# Patient Record
Sex: MALE | Race: WHITE | NOT HISPANIC OR LATINO | Employment: OTHER | URBAN - METROPOLITAN AREA
[De-identification: names, ages, dates, MRNs, and addresses within clinical notes are randomized per-mention and may not be internally consistent; named-entity substitution may affect disease eponyms.]

---

## 2019-07-02 PROCEDURE — 88305 TISSUE EXAM BY PATHOLOGIST: CPT | Performed by: PATHOLOGY

## 2019-07-03 ENCOUNTER — LAB REQUISITION (OUTPATIENT)
Dept: LAB | Facility: HOSPITAL | Age: 79
End: 2019-07-03
Payer: MEDICARE

## 2019-07-03 DIAGNOSIS — D49.2 NEOPLASM OF UNSPECIFIED BEHAVIOR OF BONE, SOFT TISSUE, AND SKIN: ICD-10-CM

## 2019-07-07 ENCOUNTER — ANESTHESIA EVENT (OUTPATIENT)
Dept: PERIOP | Facility: HOSPITAL | Age: 79
End: 2019-07-07
Payer: MEDICARE

## 2019-07-08 ENCOUNTER — HOSPITAL ENCOUNTER (OUTPATIENT)
Facility: HOSPITAL | Age: 79
Setting detail: OUTPATIENT SURGERY
Discharge: HOME | End: 2019-07-08
Attending: PLASTIC SURGERY | Admitting: PLASTIC SURGERY
Payer: MEDICARE

## 2019-07-08 ENCOUNTER — ANESTHESIA (OUTPATIENT)
Dept: PERIOP | Facility: HOSPITAL | Age: 79
End: 2019-07-08
Payer: MEDICARE

## 2019-07-08 VITALS
DIASTOLIC BLOOD PRESSURE: 63 MMHG | TEMPERATURE: 97.4 F | SYSTOLIC BLOOD PRESSURE: 124 MMHG | HEART RATE: 71 BPM | RESPIRATION RATE: 16 BRPM | WEIGHT: 216 LBS | BODY MASS INDEX: 30.92 KG/M2 | OXYGEN SATURATION: 93 % | HEIGHT: 70 IN

## 2019-07-08 DIAGNOSIS — D49.2 NEOPLASM OF UNSPECIFIED BEHAVIOR OF BONE, SOFT TISSUE, AND SKIN: ICD-10-CM

## 2019-07-08 PROCEDURE — 88331 PATH CONSLTJ SURG 1 BLK 1SPC: CPT | Performed by: PATHOLOGY

## 2019-07-08 PROCEDURE — 88305 TISSUE EXAM BY PATHOLOGIST: CPT | Performed by: PATHOLOGY

## 2019-07-08 RX ORDER — HYDROCODONE BITARTRATE AND ACETAMINOPHEN 5; 325 MG/1; MG/1
1 TABLET ORAL EVERY 6 HOURS PRN
Status: DISCONTINUED | OUTPATIENT
Start: 2019-07-08 | End: 2019-07-08 | Stop reason: HOSPADM

## 2019-07-08 RX ORDER — GLYCOPYRROLATE 0.2 MG/ML
INJECTION INTRAMUSCULAR; INTRAVENOUS AS NEEDED
Status: DISCONTINUED | OUTPATIENT
Start: 2019-07-08 | End: 2019-07-08 | Stop reason: SURG

## 2019-07-08 RX ORDER — LIDOCAINE HYDROCHLORIDE AND EPINEPHRINE 5; 5 MG/ML; UG/ML
INJECTION, SOLUTION INFILTRATION; PERINEURAL AS NEEDED
Status: DISCONTINUED | OUTPATIENT
Start: 2019-07-08 | End: 2019-07-08 | Stop reason: HOSPADM

## 2019-07-08 RX ORDER — PROPOFOL 10 MG/ML
INJECTION, EMULSION INTRAVENOUS AS NEEDED
Status: DISCONTINUED | OUTPATIENT
Start: 2019-07-08 | End: 2019-07-08 | Stop reason: SURG

## 2019-07-08 RX ORDER — ATORVASTATIN CALCIUM 80 MG/1
80 TABLET, FILM COATED ORAL DAILY
COMMUNITY

## 2019-07-08 RX ORDER — BUPIVACAINE HYDROCHLORIDE 2.5 MG/ML
INJECTION, SOLUTION INFILTRATION; PERINEURAL AS NEEDED
Status: DISCONTINUED | OUTPATIENT
Start: 2019-07-08 | End: 2019-07-08 | Stop reason: HOSPADM

## 2019-07-08 RX ORDER — MAGNESIUM HYDROXIDE 1200 MG/15ML
LIQUID ORAL AS NEEDED
Status: DISCONTINUED | OUTPATIENT
Start: 2019-07-08 | End: 2019-07-08 | Stop reason: HOSPADM

## 2019-07-08 RX ORDER — MIDAZOLAM HYDROCHLORIDE 1 MG/ML
INJECTION INTRAMUSCULAR; INTRAVENOUS AS NEEDED
Status: DISCONTINUED | OUTPATIENT
Start: 2019-07-08 | End: 2019-07-08 | Stop reason: SURG

## 2019-07-08 RX ORDER — FENTANYL CITRATE 50 UG/ML
INJECTION, SOLUTION INTRAMUSCULAR; INTRAVENOUS AS NEEDED
Status: DISCONTINUED | OUTPATIENT
Start: 2019-07-08 | End: 2019-07-08 | Stop reason: SURG

## 2019-07-08 RX ORDER — LISINOPRIL 5 MG/1
5 TABLET ORAL DAILY
COMMUNITY

## 2019-07-08 RX ORDER — DEXAMETHASONE SODIUM PHOSPHATE 4 MG/ML
INJECTION, SOLUTION INTRA-ARTICULAR; INTRALESIONAL; INTRAMUSCULAR; INTRAVENOUS; SOFT TISSUE AS NEEDED
Status: DISCONTINUED | OUTPATIENT
Start: 2019-07-08 | End: 2019-07-08 | Stop reason: SURG

## 2019-07-08 RX ORDER — ASPIRIN 81 MG/1
81 TABLET ORAL DAILY
COMMUNITY

## 2019-07-08 RX ORDER — HYDROMORPHONE HCL/PF 1 MG/ML
0.5 SYRINGE (ML) INJECTION
Status: DISCONTINUED | OUTPATIENT
Start: 2019-07-08 | End: 2019-07-08 | Stop reason: HOSPADM

## 2019-07-08 RX ORDER — FENTANYL CITRATE/PF 50 MCG/ML
25 SYRINGE (ML) INJECTION
Status: DISCONTINUED | OUTPATIENT
Start: 2019-07-08 | End: 2019-07-08 | Stop reason: HOSPADM

## 2019-07-08 RX ORDER — DIPHENHYDRAMINE HYDROCHLORIDE 50 MG/ML
12.5 INJECTION INTRAMUSCULAR; INTRAVENOUS ONCE AS NEEDED
Status: DISCONTINUED | OUTPATIENT
Start: 2019-07-08 | End: 2019-07-08 | Stop reason: HOSPADM

## 2019-07-08 RX ORDER — ONDANSETRON 4 MG/1
4 TABLET, ORALLY DISINTEGRATING ORAL EVERY 6 HOURS PRN
Status: DISCONTINUED | OUTPATIENT
Start: 2019-07-08 | End: 2019-07-08 | Stop reason: HOSPADM

## 2019-07-08 RX ORDER — METOPROLOL TARTRATE 100 MG/1
100 TABLET ORAL EVERY 12 HOURS SCHEDULED
COMMUNITY

## 2019-07-08 RX ORDER — LIDOCAINE HYDROCHLORIDE 20 MG/ML
INJECTION, SOLUTION INFILTRATION; PERINEURAL AS NEEDED
Status: DISCONTINUED | OUTPATIENT
Start: 2019-07-08 | End: 2019-07-08 | Stop reason: HOSPADM

## 2019-07-08 RX ORDER — ONDANSETRON 2 MG/ML
INJECTION INTRAMUSCULAR; INTRAVENOUS AS NEEDED
Status: DISCONTINUED | OUTPATIENT
Start: 2019-07-08 | End: 2019-07-08 | Stop reason: SURG

## 2019-07-08 RX ORDER — AMOXICILLIN 500 MG
CAPSULE ORAL DAILY
COMMUNITY

## 2019-07-08 RX ORDER — LIDOCAINE HYDROCHLORIDE 10 MG/ML
INJECTION, SOLUTION INFILTRATION; PERINEURAL AS NEEDED
Status: DISCONTINUED | OUTPATIENT
Start: 2019-07-08 | End: 2019-07-08 | Stop reason: SURG

## 2019-07-08 RX ORDER — EPHEDRINE SULFATE 50 MG/ML
INJECTION INTRAVENOUS AS NEEDED
Status: DISCONTINUED | OUTPATIENT
Start: 2019-07-08 | End: 2019-07-08 | Stop reason: SURG

## 2019-07-08 RX ORDER — SODIUM CHLORIDE, SODIUM LACTATE, POTASSIUM CHLORIDE, CALCIUM CHLORIDE 600; 310; 30; 20 MG/100ML; MG/100ML; MG/100ML; MG/100ML
50 INJECTION, SOLUTION INTRAVENOUS CONTINUOUS
Status: DISCONTINUED | OUTPATIENT
Start: 2019-07-08 | End: 2019-07-08 | Stop reason: HOSPADM

## 2019-07-08 RX ADMIN — MIDAZOLAM HYDROCHLORIDE 1 MG: 1 INJECTION, SOLUTION INTRAMUSCULAR; INTRAVENOUS at 09:37

## 2019-07-08 RX ADMIN — MIDAZOLAM HYDROCHLORIDE 1 MG: 1 INJECTION, SOLUTION INTRAMUSCULAR; INTRAVENOUS at 09:03

## 2019-07-08 RX ADMIN — ONDANSETRON HYDROCHLORIDE 4 MG: 2 INJECTION, SOLUTION INTRAMUSCULAR; INTRAVENOUS at 10:05

## 2019-07-08 RX ADMIN — SODIUM CHLORIDE, POTASSIUM CHLORIDE, SODIUM LACTATE AND CALCIUM CHLORIDE 50 ML/HR: 600; 310; 30; 20 INJECTION, SOLUTION INTRAVENOUS at 08:32

## 2019-07-08 RX ADMIN — GLYCOPYRROLATE 0.2 MG: 0.2 INJECTION, SOLUTION INTRAMUSCULAR; INTRAVENOUS at 08:55

## 2019-07-08 RX ADMIN — PROPOFOL 50 MG: 10 INJECTION, EMULSION INTRAVENOUS at 09:47

## 2019-07-08 RX ADMIN — EPHEDRINE SULFATE 10 MG: 50 INJECTION, SOLUTION INTRAVENOUS at 09:23

## 2019-07-08 RX ADMIN — DEXAMETHASONE SODIUM PHOSPHATE 4 MG: 4 INJECTION, SOLUTION INTRA-ARTICULAR; INTRALESIONAL; INTRAMUSCULAR; INTRAVENOUS; SOFT TISSUE at 09:38

## 2019-07-08 RX ADMIN — EPHEDRINE SULFATE 15 MG: 50 INJECTION, SOLUTION INTRAVENOUS at 11:02

## 2019-07-08 RX ADMIN — EPHEDRINE SULFATE 10 MG: 50 INJECTION, SOLUTION INTRAVENOUS at 09:32

## 2019-07-08 RX ADMIN — EPHEDRINE SULFATE 15 MG: 50 INJECTION, SOLUTION INTRAVENOUS at 09:57

## 2019-07-08 RX ADMIN — FENTANYL CITRATE 50 MCG: 50 INJECTION INTRAMUSCULAR; INTRAVENOUS at 09:03

## 2019-07-08 RX ADMIN — GLYCOPYRROLATE 0.1 MG: 0.2 INJECTION, SOLUTION INTRAMUSCULAR; INTRAVENOUS at 09:30

## 2019-07-08 RX ADMIN — LIDOCAINE HYDROCHLORIDE 40 MG: 10 INJECTION, SOLUTION INFILTRATION; PERINEURAL at 09:03

## 2019-07-08 RX ADMIN — PROPOFOL 60 MG: 10 INJECTION, EMULSION INTRAVENOUS at 09:03

## 2019-07-08 RX ADMIN — SODIUM CHLORIDE, POTASSIUM CHLORIDE, SODIUM LACTATE AND CALCIUM CHLORIDE: 600; 310; 30; 20 INJECTION, SOLUTION INTRAVENOUS at 11:09

## 2019-07-08 RX ADMIN — FENTANYL CITRATE 50 MCG: 50 INJECTION INTRAMUSCULAR; INTRAVENOUS at 09:47

## 2019-07-08 NOTE — OP NOTE
OPERATIVE REPORT  PATIENT NAME: Jermaine Sequeira  :  1940  MRN: [id number]  Pt Location:  OR ROOM 11    SURGERY DATE: 2019    Surgeon(s) and Role:     * Beverley Jeffers MD - Primary     * Abrahan Murphy - Assisting    Preop and postoperative Diagnosis:  Basal carcinoma right medial ankle    Operative Procedure(s) (LRB):  REMOVE LEG SKIN CANCER, FROZEN SECTION, FLAP REPAIR (Right)  based propeller flap    Operative history:  The patient had a lesion of the medial right ankle measuring about 20 x 25 mm in size  it was removed taking 5 mm margins  A suture was placed at the 12:00 p m  Margin  Frozen section examination revealed this to be a basal carcinoma totally removed  To cover the area a propeller flap proximal to this based on a posterior tibial artery  was designed  The width of the flap was equal to that of the defect  The distance from the distal point of the defect to the  was 8 5 cm  The distance from the  to the proximal end of the tapered flap was 10 5 cm  Direct closure of the donor site was possible  Operative procedure: The patient taken to the operating placed supine the operating table  He was prepped and draped in the usual fashion  Anesthesia was general via laryngeal mask anesthesia  The lesion was excised sharply using the Bovie for cutting coagulation purposes  Is marked and sent for frozen section examination  This revealed all tumor to be removed  Therefore whereby thermograhphy the  was considered to be present, a flap was designed with the minor Blade measured from the defect to the  with length being slightly less than that of the major Blade that extended proximally  The tourniquet was elevated to 350 mm hg at the thigh level without elevation of the leg nor exsanguination  An exploratory incision was made on the anterior margin of the designed flap at the level of the     This was taken down to the deep fascia  Dissection then proceeded going posteriorly until the  was found  The deep fascia was divided proximal and distal to the  allow it to be carefully dissected down to the posterior tibial vessels  This required coagulation the side branches with the bipolar and/or hemoclips  The posterior border could then be incised and the flap raised at a suprafascial level superior inferior to that   The flap could easily be rotated counter-clockwise into the defect  The tourniquet was deflated good fill of the flap was noted  The flap was cut to fit and inset over the defect with 3-0 nylon interrupted simple and vertical mattress sutures, as were the size of the flap  The donor site could be closed directly with 3-0 nylon interrupted simple vertical mattress sutures  A Penrose drain was placed under the superior aspect of the flap  A bulky wrap was applied  The patient tolerated procedure well taken to recovery in good condition        Specimen(s):  ID Type Source Tests Collected by Time Destination   1 : right ankle lesion Tissue Lesion TISSUE EXAM Louann Everett MD 7/8/2019 0930        Estimated Blood Loss:   Minimal    Drains:  Open Drain Right Foot (Active)   Number of days: 0         SIGNATURE: Louann Everett MD  DATE: July 8, 2019  TIME: 12:01 PM

## 2019-07-08 NOTE — PERIOPERATIVE NURSING NOTE
Returned from [employer] awake and denying any pain  Dressing dry and intact  Toes cool to touch  Eating and drinking  ivs running

## 2019-07-08 NOTE — ANESTHESIA POSTPROCEDURE EVALUATION
Post-Op Assessment Note    CV Status:  Stable    Pain management: satisfactory to patient     Mental Status:  Alert   Hydration Status:  Stable   PONV Controlled:  Controlled   Airway Patency:  Patent   Post Op Vitals Reviewed: Yes      Staff: CRNA           BP   149/70   Temp     Pulse 78   Resp   22   SpO2   98

## 2019-07-08 NOTE — PERIOPERATIVE NURSING NOTE
Ate   Dressing dry  Wants to go home  ivs out  Discharged via w/c after discharge instructions given and verbalized an understanding of same

## 2019-07-08 NOTE — ANESTHESIA PREPROCEDURE EVALUATION
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) > 6 months, CAD , Cardiac stents > 1 year No angina , Aortic disease (AAA Infrarenal 4 1cm),   Comment: EF 45% ECHO 2018,  Pulmonary  Smoker cigarette smoker  , Tobacco cessation counseling given ,        GI/Hepatic  Negative GI/hepatic ROS          Negative  ROS        Endo/Other  Negative endo/other ROS      GYN       Hematology  Negative hematology ROS      Musculoskeletal  Negative musculoskeletal ROS        Neurology      Comment: H/O Gullian-Aripeka syndrome Psychology   Negative psychology ROS            Component Name Value Ref Range   Echo EF Estimated 45     Result Narrative    ECHO REPORT    ROMY ARZOLA                       10/02/2018 08:14        Account Number:   [de-identified]    Clinical Indications:    S/P PTCA (percutaneous transluminal coronary     angioplasty)    Technical Quality:    ICD Codes:          Z98 61    Rhythm:    Technical Notes:    CONCLUSIONS    Definity Echo Contrast was utilized due to suboptimal echocardiographic imaging  Normal left ventricular chamber size  Mildly reduced left ventricular     systolic function  LAD regional wall motion abnormality  Normal left     ventricular wall thickness  Estimated left ventricular ejection fraction is 45-    50%       Normal right ventricular size and function       Mildly dilated left atrium      Aortic sclerosis without stenosis       Mild tricuspid regurgitation      Mild diastolic dysfunction with normal filling pressures        Visually Estimated LV Ejection Fraction is:45%      CARDIAC CHAMBER ASSESSMENT    2D ECHO MEASUREMENTS    LV Systolic Diameter Base FM      4 4 cm                    LV Diastolic Diameter Base LX     5 3 cm                    LV Ejection Fraction SIM          62 8 %                    Fractional Shortening BASE OB     0 4                   5 08-6 12    LVPW Diastolic [id number]          2 9 cm                0 6-1 1 cm    IVS Diastolic Thickness           1 3 cm                0 6-1 1 cm    IVS to PW Ratio                   1 3                       LA AP                             4 0 cm                    LV Mass                           232 5 g                   LV Mass Index                     104 7 g/m²                LA Volume                         43 6 ml                   LA Volume Index                   19 6 ml/m²                LV Diastolic Volume SIM           161 7 ml                  LV Systolic Volume LGW            42 1 ml               8 2-5 4    LV Diastolic Diameter Index       2 4 cm/m²                 TAPSE                             2 7 cm                    LV Stroke Volume SIM              101 6 ml                                              62                              83                LEFT VENTRICLE    Normal left ventricular chamber size  Mildly reduced left ventricular     systolic function  LAD regional wall motion abnormality  Normal left     ventricular wall thickness  Estimated left ventricular ejection fraction is 45-    50%  RIGHT VENTRICLE    Normal right ventricular size and function  LEFT ATRIUM    Mildly dilated left atrium  RIGHT ATRIUM    Normal right atrial size  AORTA    Normal aortic root for body surface area  PERICARDIUM    Normal pericardium without effusion  VEINS    Normal size inferior vena cava with normal inspiratory collapse consistent     with normal right atrial pressures (0-5mmhg)      Other Comments      VALVES, DOPPLER AND HEMODYNAMICS    DOPPLER MEASUREMENTS    Heart Rate                        45 0 bpm                  LVOT Peak Velocity                79 1 cm/s                 LVOT Peak Gradient                2 5 mmHg                  LVOT Velocity Time Integral       19 4 cm                   Mitral E Point Velocity           68 9 cm/s                 Mitral E to A Ratio               0 8                       MV Deceleration Time              338 0 ms                  MV E Prime Velocity Septum        5 7 cm/s                  MV E to E Prime Ratio Septal      12 2                      MV E Prime Velocity Lateral Wall  6 3 cm/s                  MV E to E Prime Ratio Lateral Wa  10 9                      MV E Prime Average                6 0 cm/s                  E/e' Average                      11 5                      A Prime Velocity                  8 5 cm/s                  S Velocity                        11 9 cm/s                 TR Peak Velocity                  242 5 cm/s                TR Peak Gradient                  23 5 mmHg                 RV Systolic [id number]              41 9 mmHg                 Pulmonary Artery Systolic JPNGGF  77 6 mmHg                 RA Pressure (Entered Value)       3 0 mmHg                      Color flow, pulse wave, and continuous wave Doppler were performed     and analyzed  AORTIC VALVE    Aortic sclerosis without stenosis  MITRAL VALVE    Thickened mitral valve leaflets  Trace mitral regurgitation  TRICUSPID VALVE    Structurally normal tricuspid valve  Mild tricuspid regurgitation  PULMONIC VALVE    The pulmonic valve is not well visualized  DOPPLER HEMODYNAMICS    Normal pulmonary artery systolic pressure  Mild diastolic dysfunction     with normal filling pressures  Nolberto Espinoza MD                    Fellow:     (Electronically Signed)     Final Date:02 October 2018 11:23    Other Result Information   Interface, Rad Results In - 10/02/2018 11:23 AM EDT   ECHO REPORT    ROMY ARZOLA                       10/02/2018 08:14        Account Number:   [de-identified]    Clinical Indications:    S/P PTCA (percutaneous transluminal coronary     angioplasty)    Technical Quality:    ICD Codes:          Z98 61    Rhythm:    Technical Notes:    CONCLUSIONS    Definity Echo Contrast was utilized due to suboptimal echocardiographic imaging  Normal left ventricular chamber size  Mildly reduced left ventricular     systolic function  LAD regional wall motion abnormality  Normal left     ventricular wall thickness  Estimated left ventricular ejection fraction is 45-    50%  Normal right ventricular size and function  Mildly dilated left atrium  Aortic sclerosis without stenosis  Mild tricuspid regurgitation  Mild diastolic dysfunction with normal filling pressures        Visually Estimated LV Ejection Fraction is:45%      CARDIAC CHAMBER ASSESSMENT    2D ECHO MEASUREMENTS    LV Systolic Diameter Base LX      3 1 cm                    LV Diastolic Diameter Base LX     5 3 cm                    LV Ejection Fraction SIM          62 8 %                    Fractional Shortening BASE LX     0 4                   0  06-0 46    LVPW Diastolic Thickness          1 0 cm                0 6-1 1 cm    IVS Diastolic Thickness           1 3 cm                0 6-1 1 cm    IVS to PW Ratio                   1 3                       LA AP                             4 0 cm                    LV Mass                           232 5 g                   LV Mass Index                     104 7 g/m²                LA Volume                         43 6 ml                   LA Volume Index                   19 6 ml/m²                LV Diastolic Volume SIM           161 7 ml                  LV Systolic Volume SIM            60 1 ml               9 0-9 6    LV Diastolic Diameter Index       2 4 cm/m²                 TAPSE                             2 7 cm                    LV Stroke Volume SIM              101 6 ml                                              62                              83                LEFT VENTRICLE    Normal left ventricular chamber size  Mildly reduced left ventricular     systolic function  LAD regional wall motion abnormality   Normal left     ventricular wall thickness  Estimated left ventricular ejection fraction is 45-    50%  RIGHT VENTRICLE    Normal right ventricular size and function  LEFT ATRIUM    Mildly dilated left atrium  RIGHT ATRIUM    Normal right atrial size  AORTA    Normal aortic root for body surface area  PERICARDIUM    Normal pericardium without effusion  VEINS    Normal size inferior vena cava with normal inspiratory collapse consistent     with normal right atrial pressures (0-5mmhg)  Other Comments      VALVES, DOPPLER AND HEMODYNAMICS    DOPPLER MEASUREMENTS    Heart Rate                        45 0 bpm                  LVOT Peak Velocity                79 1 cm/s                 LVOT Peak Gradient                2 5 mmHg                  LVOT Velocity Time Integral       19 4 cm                   Mitral E Point Velocity           68 9 cm/s                 Mitral E to A Ratio               0 8                       MV Deceleration Time              338 0 ms                  MV E Prime Velocity Septum        5 7 cm/s                  MV E to E Prime Ratio Septal      12 2                      MV E Prime Velocity Lateral Wall  6 3 cm/s                  MV E to E Prime Ratio Lateral Wa  10 9                      MV E Prime Average                6 0 cm/s                  E/e' Average                      11 5                      A Prime Velocity                  8 5 cm/s                  S Velocity                        11 9 cm/s                 TR Peak Velocity                  242 5 cm/s                TR Peak Gradient                  23 5 mmHg                 RV Systolic Pressure              26 5 mmHg                 Pulmonary Artery Systolic Pressu  71 6 mmHg                 RA Pressure (Entered Value)       3 0 mmHg                      Color flow, pulse wave, and continuous wave Doppler were performed     and analyzed  AORTIC VALVE    Aortic sclerosis without stenosis        MITRAL VALVE    Thickened mitral valve leaflets  Trace mitral regurgitation  TRICUSPID VALVE    Structurally normal tricuspid valve  Mild tricuspid regurgitation  PULMONIC VALVE    The pulmonic valve is not well visualized  DOPPLER HEMODYNAMICS    Normal pulmonary artery systolic pressure  Mild diastolic dysfunction     with normal filling pressures  Yumiko Monk MD                    Fellow:     (Electronically Signed)     Final Date:02 October 2018 11:23    Status Results Details       Physical Exam    Airway    Mallampati score: II  TM Distance: >3 FB  Neck ROM: full     Dental       Cardiovascular  Cardiovascular exam normal    Pulmonary  Pulmonary exam normal     Other Findings        Anesthesia Plan  ASA Score- 3     Anesthesia Type- general with ASA Monitors  Additional Monitors:   Airway Plan: LMA  Comment: General requested by surgeon  Plan Factors-    Induction-     Postoperative Plan-     Informed Consent- Anesthetic plan and risks discussed with patient  I personally reviewed this patient with the CRNA  Discussed and agreed on the Anesthesia Plan with the CRNA  Ruba Alaniz

## 2019-07-11 PROCEDURE — 88305 TISSUE EXAM BY PATHOLOGIST: CPT | Performed by: PATHOLOGY

## 2019-07-12 ENCOUNTER — LAB REQUISITION (OUTPATIENT)
Dept: LAB | Facility: HOSPITAL | Age: 79
End: 2019-07-12
Payer: MEDICARE

## 2019-07-12 DIAGNOSIS — D49.2 NEOPLASM OF UNSPECIFIED BEHAVIOR OF BONE, SOFT TISSUE, AND SKIN: ICD-10-CM

## 2019-08-23 ENCOUNTER — LAB REQUISITION (OUTPATIENT)
Dept: LAB | Facility: HOSPITAL | Age: 79
End: 2019-08-23
Payer: MEDICARE

## 2019-08-23 DIAGNOSIS — D49.2 NEOPLASM OF UNSPECIFIED BEHAVIOR OF BONE, SOFT TISSUE, AND SKIN: ICD-10-CM

## 2019-08-23 PROCEDURE — 88305 TISSUE EXAM BY PATHOLOGIST: CPT | Performed by: PATHOLOGY

## 2024-05-29 NOTE — PRE-PROCEDURE INSTRUCTIONS
Pre-Surgery Instructions:   Medication Instructions    aspirin (ECOTRIN LOW STRENGTH) 81 mg EC tablet Stop taking 7 days prior to surgery.    atorvastatin (LIPITOR) 80 mg tablet Take night before surgery    lisinopril (ZESTRIL) 5 mg tablet Take night before surgery    metoprolol tartrate (LOPRESSOR) 100 mg tablet Take day of surgery.    Omega-3 Fatty Acids (FISH OIL) 1200 MG CAPS Stop taking 7 days prior to surgery.    Medication instructions for day surgery reviewed. Please use only a sip of water to take your instructed medications. Avoid all over the counter vitamins, supplements and NSAIDS for one week prior to surgery per anesthesia guidelines. Tylenol is ok to take as needed.     You will receive a call one business day prior to surgery with an arrival time and hospital directions. If your surgery is scheduled on a Monday, the hospital will be calling you on the Friday prior to your surgery. If you have not heard from anyone by 8pm, please call the hospital supervisor through the hospital  at [phone]. ([place] [phone] or [place] [phone]).    Do not eat or drink anything after midnight the night before your surgery, including candy, mints, lifesavers, or chewing gum. Do not drink alcohol 24hrs before your surgery. Try not to smoke at least 24hrs before your surgery.       Follow the pre surgery showering instructions as listed in the “My Surgical Experience Booklet” or otherwise provided by your surgeon's office. Do not use a blade to shave the surgical area 1 week before surgery. It is okay to use a clean electric clippers up to 24 hours before surgery. Do not apply any lotions, creams, including makeup, cologne, deodorant, or perfumes after showering on the day of your surgery. Do not use dry shampoo, hair spray, hair gel, or any type of hair products.     No contact lenses, eye make-up, or artificial eyelashes. Remove nail polish, including gel polish, and any artificial, gel, or  acrylic nails if possible. Remove all jewelry including rings and body piercing jewelry.     Wear causal clothing that is easy to take on and off. Consider your type of surgery.    Keep any valuables, jewelry, piercings at home. Please bring any specially ordered equipment (sling, braces) if indicated.    Arrange for a responsible person to drive you to and from the hospital on the day of your surgery. Please confirm the visitor policy for the day of your procedure when you receive your phone call with an arrival time.     Call the surgeon's office with any new illnesses, exposures, or additional questions prior to surgery.    Please reference your “My Surgical Experience Booklet” for additional information to prepare for your upcoming surgery.

## 2024-06-05 ENCOUNTER — ANESTHESIA (OUTPATIENT)
Dept: PERIOP | Facility: HOSPITAL | Age: 84
End: 2024-06-05
Payer: MEDICARE

## 2024-06-05 ENCOUNTER — ANESTHESIA EVENT (OUTPATIENT)
Dept: PERIOP | Facility: HOSPITAL | Age: 84
End: 2024-06-05
Payer: MEDICARE

## 2024-06-05 ENCOUNTER — HOSPITAL ENCOUNTER (OUTPATIENT)
Facility: HOSPITAL | Age: 84
Setting detail: OUTPATIENT SURGERY
Discharge: HOME | End: 2024-06-05
Attending: PLASTIC SURGERY | Admitting: PLASTIC SURGERY
Payer: MEDICARE

## 2024-06-05 VITALS
WEIGHT: 190 LBS | RESPIRATION RATE: 18 BRPM | HEIGHT: 70 IN | OXYGEN SATURATION: 94 % | BODY MASS INDEX: 27.2 KG/M2 | HEART RATE: 77 BPM | TEMPERATURE: 97.6 F | SYSTOLIC BLOOD PRESSURE: 117 MMHG | DIASTOLIC BLOOD PRESSURE: 67 MMHG

## 2024-06-05 DIAGNOSIS — D49.2 NEOPLASM OF UNSPECIFIED BEHAVIOR OF BONE, SOFT TISSUE, AND SKIN: ICD-10-CM

## 2024-06-05 PROBLEM — I10 HTN (HYPERTENSION): Status: ACTIVE | Noted: 2024-06-05

## 2024-06-05 PROBLEM — I25.10 CAD (CORONARY ARTERY DISEASE): Status: ACTIVE | Noted: 2024-06-05

## 2024-06-05 PROBLEM — R07.9 CHEST PAIN: Status: ACTIVE | Noted: 2024-06-05

## 2024-06-05 PROBLEM — I21.9 MI (MYOCARDIAL INFARCTION) (HCC): Status: ACTIVE | Noted: 2024-06-05

## 2024-06-05 PROCEDURE — 88332 PATH CONSLTJ SURG EA ADD BLK: CPT | Performed by: PATHOLOGY

## 2024-06-05 PROCEDURE — 88331 PATH CONSLTJ SURG 1 BLK 1SPC: CPT | Performed by: PATHOLOGY

## 2024-06-05 PROCEDURE — 88305 TISSUE EXAM BY PATHOLOGIST: CPT | Performed by: PATHOLOGY

## 2024-06-05 RX ORDER — FENTANYL CITRATE/PF 50 MCG/ML
25 SYRINGE (ML) INJECTION
Status: DISCONTINUED | OUTPATIENT
Start: 2024-06-05 | End: 2024-06-05 | Stop reason: HOSPADM

## 2024-06-05 RX ORDER — MAGNESIUM HYDROXIDE 1200 MG/15ML
LIQUID ORAL AS NEEDED
Status: DISCONTINUED | OUTPATIENT
Start: 2024-06-05 | End: 2024-06-05 | Stop reason: HOSPADM

## 2024-06-05 RX ORDER — SODIUM CHLORIDE, SODIUM LACTATE, POTASSIUM CHLORIDE, CALCIUM CHLORIDE 600; 310; 30; 20 MG/100ML; MG/100ML; MG/100ML; MG/100ML
125 INJECTION, SOLUTION INTRAVENOUS CONTINUOUS
Status: DISCONTINUED | OUTPATIENT
Start: 2024-06-05 | End: 2024-06-05 | Stop reason: HOSPADM

## 2024-06-05 RX ORDER — SODIUM CHLORIDE, SODIUM LACTATE, POTASSIUM CHLORIDE, CALCIUM CHLORIDE 600; 310; 30; 20 MG/100ML; MG/100ML; MG/100ML; MG/100ML
INJECTION, SOLUTION INTRAVENOUS CONTINUOUS PRN
Status: DISCONTINUED | OUTPATIENT
Start: 2024-06-05 | End: 2024-06-05

## 2024-06-05 RX ORDER — TRAMADOL HYDROCHLORIDE 50 MG/1
50 TABLET ORAL ONCE
Status: COMPLETED | OUTPATIENT
Start: 2024-06-05 | End: 2024-06-05

## 2024-06-05 RX ORDER — EPHEDRINE SULFATE 50 MG/ML
INJECTION INTRAVENOUS AS NEEDED
Status: DISCONTINUED | OUTPATIENT
Start: 2024-06-05 | End: 2024-06-05

## 2024-06-05 RX ORDER — ONDANSETRON 2 MG/ML
INJECTION INTRAMUSCULAR; INTRAVENOUS AS NEEDED
Status: DISCONTINUED | OUTPATIENT
Start: 2024-06-05 | End: 2024-06-05

## 2024-06-05 RX ORDER — PROPOFOL 10 MG/ML
INJECTION, EMULSION INTRAVENOUS AS NEEDED
Status: DISCONTINUED | OUTPATIENT
Start: 2024-06-05 | End: 2024-06-05

## 2024-06-05 RX ORDER — LIDOCAINE HYDROCHLORIDE 10 MG/ML
INJECTION, SOLUTION EPIDURAL; INFILTRATION; INTRACAUDAL; PERINEURAL AS NEEDED
Status: DISCONTINUED | OUTPATIENT
Start: 2024-06-05 | End: 2024-06-05

## 2024-06-05 RX ORDER — ONDANSETRON 2 MG/ML
4 INJECTION INTRAMUSCULAR; INTRAVENOUS ONCE AS NEEDED
Status: DISCONTINUED | OUTPATIENT
Start: 2024-06-05 | End: 2024-06-05 | Stop reason: HOSPADM

## 2024-06-05 RX ORDER — ONDANSETRON 4 MG/1
4 TABLET, ORALLY DISINTEGRATING ORAL EVERY 6 HOURS PRN
Status: DISCONTINUED | OUTPATIENT
Start: 2024-06-05 | End: 2024-06-05 | Stop reason: HOSPADM

## 2024-06-05 RX ORDER — IBUPROFEN 600 MG/1
600 TABLET ORAL EVERY 6 HOURS PRN
Status: DISCONTINUED | OUTPATIENT
Start: 2024-06-05 | End: 2024-06-05 | Stop reason: HOSPADM

## 2024-06-05 RX ORDER — FENTANYL CITRATE 50 UG/ML
INJECTION, SOLUTION INTRAMUSCULAR; INTRAVENOUS AS NEEDED
Status: DISCONTINUED | OUTPATIENT
Start: 2024-06-05 | End: 2024-06-05

## 2024-06-05 RX ADMIN — FENTANYL CITRATE 25 MCG: 50 INJECTION, SOLUTION INTRAMUSCULAR; INTRAVENOUS at 09:20

## 2024-06-05 RX ADMIN — EPHEDRINE SULFATE 10 MG: 50 INJECTION INTRAVENOUS at 09:47

## 2024-06-05 RX ADMIN — FENTANYL CITRATE 25 MCG: 50 INJECTION, SOLUTION INTRAMUSCULAR; INTRAVENOUS at 09:11

## 2024-06-05 RX ADMIN — TRAMADOL HYDROCHLORIDE 50 MG: 50 TABLET, COATED ORAL at 11:42

## 2024-06-05 RX ADMIN — SODIUM CHLORIDE, SODIUM LACTATE, POTASSIUM CHLORIDE, AND CALCIUM CHLORIDE: .6; .31; .03; .02 INJECTION, SOLUTION INTRAVENOUS at 08:38

## 2024-06-05 RX ADMIN — SODIUM CHLORIDE, SODIUM LACTATE, POTASSIUM CHLORIDE, AND CALCIUM CHLORIDE 125 ML/HR: .6; .31; .03; .02 INJECTION, SOLUTION INTRAVENOUS at 08:01

## 2024-06-05 RX ADMIN — ONDANSETRON 4 MG: 2 INJECTION INTRAMUSCULAR; INTRAVENOUS at 09:36

## 2024-06-05 RX ADMIN — PROPOFOL 150 MG: 10 INJECTION, EMULSION INTRAVENOUS at 09:00

## 2024-06-05 RX ADMIN — EPHEDRINE SULFATE 10 MG: 50 INJECTION INTRAVENOUS at 09:05

## 2024-06-05 RX ADMIN — LIDOCAINE HYDROCHLORIDE 50 MG: 10 INJECTION, SOLUTION EPIDURAL; INFILTRATION; INTRACAUDAL; PERINEURAL at 09:00

## 2024-06-05 RX ADMIN — FENTANYL CITRATE 50 MCG: 50 INJECTION, SOLUTION INTRAMUSCULAR; INTRAVENOUS at 09:31

## 2024-06-05 NOTE — OP NOTE
OPERATIVE REPORT  PATIENT NAME: Uvaldo Simpson Jr.    :  1940  MRN: [id number]  Pt Location:  OR ROOM 08    SURGERY DATE: 2024    Surgeons and Role:     * Alex Elizabeth MD - Primary    Preop and postoperative diagnosis: Medial left clavicle skin cancer    Procedure(s):  Left - REMOVE CLAVICAL LESION. FROZEN SECTION. 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  The lesion there measured approximately 30 x 32 mm in size.  It was removed taking 5 mm margins.  A suture was placed at the 12:00 or cephalic margin.  Frozen section examination of the specimen was done.  This showed that the margin from 12-3 o'clock still had squamous cell carcinoma in situ.  Therefore an additional 5 mm margin was taken from approximately 11:00 to 12:00 to 3:00 margin.  An additional portion of soft tissue was removed separately the 3:00 margin those latter 2 specimens being for permanent examination only.  To close the defect and avoid a skin graft as the patient did not want 1, a Keystone flap was designed caudal to the defect of excision of with a little over 45 mm in size and length being approximately 120 mm in size.  This could be advanced to close the defect without needing to do a skin graft.    Operative procedure: The patient was taken to the operating room placed supine on the operating table.  He was prepped and draped in usual fashion.  Anesthesia was general via endotracheal tube.  This  was supplemented with Xylocaine 1% with epinephrine and quarter percent plain Marcaine for postoperative analgesia.  Excision of the lesion was then performed using the Bovie for cutting coagulation purposes the specimen was marked at the 12:00 margin.  Frozen section examination stated that tumor was still present as described above so additional margins were then performed for permanent examination only is grossly that seem to have been totally removed initially.    A Keystone flap was then designed inferior to the margin of excision.  The vertical height of the [place] flap exceeded that of the defect.  Tangents were drawn from the medial and lateral aspect of the defect of length approximately 45 mm.  These tendons were then connected to form the greater curvature of the [place] flap.  The size and greater curvature were then incised with the Bovie for cutting coagulation purposes freed up through the subcutaneous tissues of all fascial connections until the flap could be advanced superiorly to close the defect without tension.  Hemostasis throughout was carefully achieved with the Bovie.    The lesser curvature of the flap was then sewn to the superior aspect of the defect with a 4-0 nylon vertical mattress suture on either side 2-0 Vicryl interrupted simple subcutaneous sutures were placed.  The lesser curvature was then closed with 3-0 nylon interrupted vertical mattress sutures.  In a similar fashion both sides as well as the greater curvature were closed with 3-0 nylon interrupted vertical mattress sutures.  This left the open areas after the advancement much like in the Y portion of the V to Y advancement flap to be closed.  That was accomplished with 3-0 nylon interrupted vertical mattress sutures.  Light dressings were applied throughout.  The patient tolerated the procedure well was then taken to the recovery area in good condition.    SIGNATURE: Alex Elizabeth MD  DATE: June 5, 2024  TIME: 10:48  AM

## 2024-06-05 NOTE — DISCHARGE INSTR - AVS FIRST PAGE
1.  Do not stretch the neck backwards  2.  Return office in 2 days  3.  May remove dressings and shower

## 2024-06-05 NOTE — ANESTHESIA PREPROCEDURE EVALUATION
Procedure:  REMOVE CLAVICAL LESION WITH FROZEN SECTION WITH REPAIR (Left: Chest)  SKIN GRAFT FULL THICKNESS  ([facility]) EXTREMITY (Left: Chest)    Relevant Problems   CARDIO   (+) CAD (coronary artery disease)   (+) Chest pain   (+) HTN (hypertension)   (+) MI (myocardial infarction) (HCC)      Oncology   (+) Neoplasm of unspecified behavior of bone, soft tissue, and skin        Physical Exam    Airway    Mallampati score: II  TM Distance: >3 FB  Neck ROM: full     Dental    upper dentures and lower dentures    Cardiovascular  Cardiovascular exam normal    Pulmonary  Pulmonary exam normal     Other Findings        Anesthesia Plan  ASA Score- 3     Anesthesia Type- IV sedation with anesthesia with ASA Monitors.         Additional Monitors:     Airway Plan: LMA.    Comment: This result has an attachment that is not available.  ·  Left Ventricle: Left ventricle is normal in size. There is mild  concentric hypertrophy. Systolic function is mildly decreased with an  ejection fraction of 45-50%. Distal anteroseptal and apical akinesis.  There is grade I (mild) diastolic dysfunction.  ·  Right Ventricle: Right ventricle cavity is normal. Systolic function is  normal.  ·  Left Atrium: Left atrium cavity is mildly dilated.  ·  Mitral Valve: There is mild regurgitation.  ·  Aortic Valve: The aortic valve is trileaflet. There is sclerosis.  ·  Tricuspid Valve: There is trace regurgitation.    Left Ventricle  Left ventricle is normal in size. There is mild concentric hypertrophy. Systolic function is mildly decreased with an ejection fraction of 45-50%. Distal anteroseptal and apical akinesis. There is grade I (mild) diastolic dysfunction.    Right Ventricle  Right ventricle cavity is normal. Systolic function is normal.    Left Atrium  Left atrium cavity is mildly dilated.    Right Atrium  Right atrium cavity is normal.    IVC/SVC  IVC not well visualized.    Mitral Valve  Mitral valve structure is normal. There is mild  regurgitation. There is no evidence of mitral valve stenosis.    Tricuspid Valve  Tricuspid valve structure is normal. There is trace regurgitation. There is no evidence of tricuspid valve stenosis.    Aortic Valve  The aortic valve is trileaflet. There is sclerosis. There is no regurgitation.    Pulmonic Valve  The pulmonic valve was not well visualized. There is no regurgitation or stenosis.    Ascending Aorta  The aorta was not well visualized.    Pericardium  There is no pericardial effusion.    Study Details  A complete 2D echocardiogram was performed. Color flow, Pulse Wave and Continuous Wave Doppler was performed and analyzed.Definity contrast was used during the study. Overall the study quality was adequate.  All Measurements      .       Plan Factors-Exercise tolerance (METS): >4 METS.    Chart reviewed. EKG reviewed. Imaging results reviewed. Existing labs reviewed. Patient summary reviewed.    Patient is not a current smoker. Patient not instructed to abstain from smoking on day of procedure. Patient did not smoke on day of surgery.    Obstructive sleep apnea risk education given perioperatively.There is medical exclusion for perioperative obstructive sleep apnea risk education.        Induction- intravenous.    Postoperative Plan-         Informed Consent- Anesthetic plan and risks discussed with patient.  I personally reviewed this patient with the CRNA. Discussed and agreed on the Anesthesia Plan with the CRNA..

## 2024-06-05 NOTE — ANESTHESIA POSTPROCEDURE EVALUATION
Post-Op Assessment Note    CV Status:  Stable  Pain Score: 0    Pain management: adequate       Mental Status:  Awake and sleepy   Hydration Status:  Euvolemic   PONV Controlled:  Controlled   Airway Patency:  Patent     Post Op Vitals Reviewed: Yes    No anethesia notable event occurred.    Staff: MANUEL           /63 (06/05/24 1031)    Temp 97.9 °F (36.6 °C) (06/05/24 1031)    Pulse 90 (06/05/24 1031)   Resp 16 (06/05/24 1031)    SpO2 95 % (06/05/24 1031)

## 2024-06-05 NOTE — INTERVAL H&P NOTE
H&P reviewed. After examining the patient I find no changes in the patients condition since the H&P had been written.    Vitals:    06/05/24 0720   BP: 121/85   Pulse: 73   Resp: 18   Temp: (!) 96.7 °F (35.9 °C)   SpO2: 95%

## 2024-06-11 PROCEDURE — 88305 TISSUE EXAM BY PATHOLOGIST: CPT | Performed by: PATHOLOGY

## (undated) DEVICE — INTENDED FOR TISSUE SEPARATION, AND OTHER PROCEDURES THAT REQUIRE A SHARP SURGICAL BLADE TO PUNCTURE OR CUT.: Brand: BARD-PARKER SAFETY BLADES SIZE 15, STERILE

## (undated) DEVICE — SCD SEQUENTIAL COMPRESSION COMFORT SLEEVE MEDIUM KNEE LENGTH: Brand: KENDALL SCD

## (undated) DEVICE — SUT VICRYL 2-0 J459H

## (undated) DEVICE — DRESSING XEROFORM 5 X 9

## (undated) DEVICE — SYRINGE 10ML LL CONTROL TOP

## (undated) DEVICE — BULB SYRINGE,IRRIGATION WITH PROTECTIVE CAP: Brand: DOVER

## (undated) DEVICE — STERILE POLYISOPRENE POWDER-FREE SURGICAL GLOVES: Brand: PROTEXIS

## (undated) DEVICE — PENCIL ELECTROSURG E-Z CLEAN -0035H

## (undated) DEVICE — INTENDED FOR TISSUE SEPARATION, AND OTHER PROCEDURES THAT REQUIRE A SHARP SURGICAL BLADE TO PUNCTURE OR CUT.: Brand: BARD-PARKER SAFETY BLADES SIZE 10, STERILE

## (undated) DEVICE — LIGHT GLOVE GREEN

## (undated) DEVICE — BASIC PACK: Brand: CONVERTORS

## (undated) DEVICE — SUT ETHILON 3-0 PS-1 18 IN 1663H

## (undated) DEVICE — 4-PORT MANIFOLD: Brand: NEPTUNE 2

## (undated) DEVICE — ACE WRAP 4 IN STERILE

## (undated) DEVICE — KERLIX BANDAGE ROLL: Brand: KERLIX

## (undated) DEVICE — INTENDED FOR TISSUE SEPARATION, AND OTHER PROCEDURES THAT REQUIRE A SHARP SURGICAL BLADE TO PUNCTURE OR CUT.: Brand: BARD-PARKER ® SAFETYLOCK CARBON RIB-BACK BLADES

## (undated) DEVICE — DISPOSABLE OR TOWEL: Brand: CARDINAL HEALTH

## (undated) DEVICE — OCCLUSIVE GAUZE STRIP,3% BISMUTH TRIBROMOPHENATE IN PETROLATUM BLEND: Brand: XEROFORM

## (undated) DEVICE — 1820 FOAM BLOCK NEEDLE COUNTER: Brand: DEVON

## (undated) DEVICE — SUT ETHILON 3-0 PS-1 18 IN 1663G

## (undated) DEVICE — TUBING SUCTION 5MM X 12 FT

## (undated) DEVICE — STOCKINETTE 2P PREROLLD 6X60

## (undated) DEVICE — CLIP HORIZON SM RED

## (undated) DEVICE — SURGICAL CLIPPER BLADE GENERAL USE

## (undated) DEVICE — SOLUTION BOWL: Brand: KENDALL

## (undated) DEVICE — SUT SILK 3-0 FS-1 684G

## (undated) DEVICE — VESSEL LOOP MAXI RED

## (undated) DEVICE — RULER 4022800 10PK MICRO-GRID GREEN SIL

## (undated) DEVICE — BASIC SINGLE BASIN-LF: Brand: MEDLINE INDUSTRIES, INC.

## (undated) DEVICE — ABDOMINAL PAD: Brand: DERMACEA

## (undated) DEVICE — SYRINGE 30ML LL

## (undated) DEVICE — SPONGE 4 X 4 XRAY 16 PLY STRL LF RFD

## (undated) DEVICE — SUT SILK 4-0 G-3 789G

## (undated) DEVICE — STERILE POLYISOPRENE POWDER-FREE SURGICAL GLOVES WITH EMOLLIENT COATING: Brand: PROTEXIS

## (undated) DEVICE — SINGLE PORT MANIFOLD: Brand: NEPTUNE 2

## (undated) DEVICE — GAUZE SPONGES,16 PLY: Brand: CURITY

## (undated) DEVICE — PENROSE DRAIN, 18 X 3 8: Brand: CARDINAL HEALTH

## (undated) DEVICE — BETHLEHEM UNIVERSAL  MIONR EXT: Brand: CARDINAL HEALTH

## (undated) DEVICE — NEEDLE 25G X 1 1/2

## (undated) DEVICE — ULTRASOUND GEL STERILE FOIL PK

## (undated) DEVICE — CABLE BIPOLAR DISP MEGADYNE

## (undated) DEVICE — SKIN MARKER DUAL TIP WITH RULER CAP, FLEXIBLE RULER AND LABELS: Brand: DEVON

## (undated) DEVICE — CLIPS HORIZON MEDIUM BLUE

## (undated) DEVICE — SPONGE LAP STERILE 18X18

## (undated) DEVICE — NEEDLE BLUNT 18 G X 1 1/2IN